# Patient Record
Sex: MALE | Race: WHITE
[De-identification: names, ages, dates, MRNs, and addresses within clinical notes are randomized per-mention and may not be internally consistent; named-entity substitution may affect disease eponyms.]

---

## 2017-05-10 ENCOUNTER — HOSPITAL ENCOUNTER (OUTPATIENT)
Dept: HOSPITAL 84 - D.RT | Age: 67
Discharge: HOME | End: 2017-05-10
Attending: INTERNAL MEDICINE
Payer: MEDICARE

## 2017-05-10 VITALS — BODY MASS INDEX: 29.4 KG/M2

## 2017-05-10 DIAGNOSIS — J44.9: Primary | ICD-10-CM

## 2017-09-08 ENCOUNTER — HOSPITAL ENCOUNTER (OUTPATIENT)
Dept: HOSPITAL 84 - D.CT | Age: 67
Discharge: HOME | End: 2017-09-08
Attending: FAMILY MEDICINE
Payer: MEDICARE

## 2017-09-08 VITALS — BODY MASS INDEX: 29.4 KG/M2

## 2017-09-08 DIAGNOSIS — J20.9: Primary | ICD-10-CM

## 2017-12-20 ENCOUNTER — HOSPITAL ENCOUNTER (OUTPATIENT)
Dept: HOSPITAL 84 - D.RAD | Age: 67
Discharge: HOME | End: 2017-12-20
Attending: FAMILY MEDICINE
Payer: MEDICARE

## 2017-12-20 VITALS — BODY MASS INDEX: 29.4 KG/M2

## 2017-12-20 DIAGNOSIS — R13.10: Primary | ICD-10-CM

## 2018-03-05 ENCOUNTER — HOSPITAL ENCOUNTER (EMERGENCY)
Dept: HOSPITAL 84 - D.ER | Age: 68
Discharge: HOME | End: 2018-03-05
Payer: MEDICARE

## 2018-03-05 VITALS — BODY MASS INDEX: 29.4 KG/M2

## 2018-03-05 DIAGNOSIS — S81.011A: Primary | ICD-10-CM
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
## 2020-04-02 ENCOUNTER — HOSPITAL ENCOUNTER (OUTPATIENT)
Dept: HOSPITAL 84 - D.CATH | Age: 70
Discharge: HOME | End: 2020-04-02
Attending: INTERNAL MEDICINE
Payer: MEDICARE

## 2020-04-02 VITALS — DIASTOLIC BLOOD PRESSURE: 81 MMHG | SYSTOLIC BLOOD PRESSURE: 153 MMHG

## 2020-04-02 VITALS — BODY MASS INDEX: 28.5 KG/M2 | WEIGHT: 210.44 LBS | HEIGHT: 72 IN | BODY MASS INDEX: 28.5 KG/M2

## 2020-04-02 DIAGNOSIS — R94.31: ICD-10-CM

## 2020-04-02 DIAGNOSIS — E78.5: ICD-10-CM

## 2020-04-02 DIAGNOSIS — I10: ICD-10-CM

## 2020-04-02 DIAGNOSIS — I20.9: Primary | ICD-10-CM

## 2020-04-02 LAB
ALT SERPL-CCNC: 25 U/L (ref 10–68)
ANION GAP SERPL CALC-SCNC: 9.8 MMOL/L (ref 8–16)
BASOPHILS NFR BLD AUTO: 0.2 % (ref 0–2)
BUN SERPL-MCNC: 18 MG/DL (ref 7–18)
CALCIUM SERPL-MCNC: 8.9 MG/DL (ref 8.5–10.1)
CHLORIDE SERPL-SCNC: 104 MMOL/L (ref 98–107)
CHOLEST/HDLC SERPL: 3.4 RATIO (ref 2.3–4.9)
CO2 SERPL-SCNC: 29.3 MMOL/L (ref 21–32)
CREAT SERPL-MCNC: 1.2 MG/DL (ref 0.6–1.3)
EOSINOPHIL NFR BLD: 5.3 % (ref 0–7)
ERYTHROCYTE [DISTWIDTH] IN BLOOD BY AUTOMATED COUNT: 12.9 % (ref 11.5–14.5)
GLUCOSE SERPL-MCNC: 149 MG/DL (ref 74–106)
HCT VFR BLD CALC: 45.1 % (ref 42–54)
HDLC SERPL-MCNC: 43 MG/DL (ref 32–96)
HGB BLD-MCNC: 14.5 G/DL (ref 13.5–17.5)
IMM GRANULOCYTES NFR BLD: 0 % (ref 0–5)
LDL-HDL RATIO: 2 RATIO (ref 1.5–3.5)
LDLC SERPL-MCNC: 85 MG/DL (ref 0–100)
LYMPHOCYTES NFR BLD AUTO: 30.2 % (ref 15–50)
MCH RBC QN AUTO: 30.9 PG (ref 26–34)
MCHC RBC AUTO-ENTMCNC: 32.2 G/DL (ref 31–37)
MCV RBC: 96 FL (ref 80–100)
MONOCYTES NFR BLD: 7.5 % (ref 2–11)
NEUTROPHILS NFR BLD AUTO: 56.8 % (ref 40–80)
OSMOLALITY SERPL CALC.SUM OF ELEC: 282 MOSM/KG (ref 275–300)
PLATELET # BLD: 242 10X3/UL (ref 130–400)
PMV BLD AUTO: 10.3 FL (ref 7.4–10.4)
POTASSIUM SERPL-SCNC: 4.1 MMOL/L (ref 3.5–5.1)
RBC # BLD AUTO: 4.7 10X6/UL (ref 4.2–6.1)
SODIUM SERPL-SCNC: 139 MMOL/L (ref 136–145)
TRIGL SERPL-MCNC: 101 MG/DL (ref 30–200)
WBC # BLD AUTO: 6.4 10X3/UL (ref 4.8–10.8)

## 2020-04-02 NOTE — NUR
PT REC'D TO ROOM 5 VIA BED FROM CATH LAB. MONITORS ESTAB. DAUGHTER AT
BS TO TRANSLATE. VSS. SEE RN ASSESSMENT.

## 2020-04-02 NOTE — NUR
L GROIN SITE SOFT, NO S/S BLEEDING OR HEMATOMA. FOOT WARM WITH PALP
PULSES. VSS. C/L IN REACH, ALARMS ON.

## 2020-04-02 NOTE — HEMODYNAMI
PATIENT:GONZALEZ REYES,RAFAEL                            MEDICAL RECORD: [id number]
: 46                                            LOCATION:D.CAT          
ACCT# [id number]                                       ADMISSION DATE: 20
 
 
 Generatedon:20209:[id number]
Patient name: RAFAEL GONZALEZ REYES Patient #: [id number] Visit #: [id number] 
SSN:
 : [id number] Date of study: 2020
Page: Of
Hemodynamic Procedure Report
****************************
Patient Data
Patient Demographics
Procedure consent was obtained
First Name: JONAS          Gender: Male
Last Name: GONZALEZ REYES   : 1950
Patient #: [id number]       Age: 69 year(s)
Visit #: [id number]       Race: Unknown
SSN: 
Accession #:
[id number]
Additional ID: [id number]
Contact details
Address: [address] [place] [address]     Phone: [phone]
State: [place]
City: [place]
Zip code: [address]
Past Medical History
Allergies: No known allergies
Admission
Admission Data
Admission Date: 2020    Admission Time: 7:09
Admit Source: Other         Insurance Payor: Medicaid,
Medicare
[facility] #: [id number]
Weight (lbs.): 205
Weight (kg.): 92.99
Lab Results
Lab Result Date: 2020   Lab Result Time: 8:00
Biochemistry
Name         Units    Result                Min      Max
BUN          mg/dl    18       --(---*)--   7        18
Creatinine   mg/dl    1.2      --(---*)--   0.6      1.3
eGFR         ml/min   63       *-(----)--   90       120
NONAFRICAN
CBC
Name         Units    Result                Min      Max
Hematocrit   %        45.1     --(-*--)--   42       54
Hemoglobin   g/dl     14.5     --(*---)--   13.5     17.5
Procedure
Procedure Types
Cath Procedure
Diagnostic Procedure
[facility] w/Coronaries
Procedure Description
Procedure Date
Procedure Date: 2020
 
Procedure Start Time: 9:20
Procedure End Time: 9:31
Procedure Staff
Name                            Function
Jeffrey Gamino MD                   Performing Physician
Buffie Gill RN                  Nurse
Ramana Quach RT                  Monitor
Alla Martinez RT                    Scrub
Indication
Dyspnea with exertion
Abnormal ECG
Angina
Palpitations
Procedure Data
Cath Procedure
Fluoroscopy
Diagnostic fluoroscopy      Total fluoroscopy Time: 1.9
time: 1.9 min               min
Diagnostic fluoroscopy      Total fluoroscopy dose: 460
dose: 460 mGy               mGy
Contrast Material
Contrast Material Type                       Amount (ml)
Isovue 370                                   52
Entry Location
Entry     Primary  Successful  Side  Size  Upsize Upsize Entry    Closure Succes
sful  Closure
Location                             (Fr)  1 (Fr) 2 (Fr) Remarks  Device        
      Remarks
Femoral                        Left  5 Fr                         Exoseal
artery
Estimated blood loss: 5 ml
Diagnostic catheters
Device Type               Used For           End Catheter
Placement
MULTIPACK JL 4.0 5Fr      Procedure
catheter
MULTIPACK 3DRC 5Fr        Procedure
catheter
MULTIPACK Pigtail 5 Fr    Procedure
catheter
Procedure Complications
No complications
Procedure Medications
Medication           Administration Route Dosage
Oxygen               etCO2 Nasal cannula  2 l/min
Lidocaine 2%         added to field       20
Heparin Flush Bag    added to field       2 bags
(1000units/500ml NS)
0.9% NaCl            I.V.                 100 ml/hr
Versed               I.V.                 2 mg
Fentanyl             I.V.                 100 mcg
Versed               I.V.                 1 mg
Hemodynamics
Rest
HGB: 14.5 (g/dl) Heart Rate: 63 (bpm)
Pressure Samples
Time     Site     Value (mmHg) Purpose      Heart      Use
Rate(bpm)
9:27     LV       131/-4,9     Snapshot     58
9:28     AO       139/68(96)   Pullback     58
 
9:28     LV       142/-2,10    Pullback     58
Gradients
Valve  Time  Site 1    Site 2     Mean    SEP/DFP    Peak To Heart  Use
(mmHg)  (sec/min)  Peak    Rate
(mmHg)  (bpm)
Aortic 9:28  LV        AO         7       18         3       58
142/-2,10 139/68(96)
Calculations
Valve    P-P      Mean      Valve     Index  Valve    Source
Name              Gradient  Area             Flow
(cm2)
Aortic   3        7
3        7
Snapshots
Pre Cath      Intra         NCS           Post Cath
Vital Signs
Time    Heart  Resp   SPO2 etCO2   NIBP (mmHg) Rhythm  Pain    Sedation
Rate   (ipm)  (%)  (mmHg)                      Status  Level
(bpm)
9:08:42 62     10     100  0       164/85(133) NSR     0 (11)  10(A)
, No
pain
9:13:01 61     11     98   0       148/83(118) NSR     0 (11)  10(A)
, No
pain
9:17:19 57     18     98   25.5    127/80(94)  NSR     0 (11)  10(A)
, No
pain
9:21:29 58     13     100  3       123/78(90)  NSR     0 (11)  9(A)
, No
pain
9:25:36 57     12     99   14.2    139/80(115) NSR     0 (11)  9(A)
, No
pain
9:29:50 59     15     100  39.8    128/78(104) NSR     0 (11)  10(A)
, No
pain
Medications
Time    Medication       Route   Dose  Verified Delivered Reason     Notes  Effe
ctiveness
by       by
9:11:19 Oxygen           etCO2   2     Jeffrey     Buffie    used for
Nasal   l/min Stevenson Gill RN   procedure
cannula
9:11:27 Lidocaine 2%     added   20ml  Jeffrey     Jeffrey      for local
to      vial  Stevenson Gamino MD  anesthetic
field
9:11:38 Heparin Flush    added   2     Jeffrey     Jeffrey      used for
Bag              to      bags  Stevenson Gamino MD  procedure
(1000units/500ml field
NS)
9:11:47 0.9% NaCl        I.V.    100   Jeffrey     Buffie    Per
ml/hr Stevenson Gill RN   physician
9:17:39 Versed           I.V.    2 mg  Jeffrey     Buffie    for
Stevenson Gill RN   sedation
9:17:46 Fentanyl         I.V.    100   Jeffrey     Buffie    for
mcg   Stevenson Gill RN   sedation
9:24:54 Versed           I.V.    1 mg  Jeffrey     Buffie    for
Stevenson Gill RN   sedation
Procedure Log
 
Time     Note
8:31:14  Informed consent obtained and on chart
8:43:41  **ACC** Patient presents with Stable Angina CCS
Anginal Class 2--Slight limitation of ordinary
activity.
8:44:45  Patient allergic to No known allergies
8:44:51  Admit Source: Other
8:44:56  Insurance Payor : Medicare, Medicaid
8:45:17  Risk of Mortality: <.1%
8:45:24  Patient Weight : 205 lbs
8:45:32  Risk of blood transfusion: <.1%
8:45:54  Risk of ABEL: 0.2
8:47:46  Lab Result : BUN 18 mg/dl
8:47:46  Lab Result : Hemoglobin 14.5 g/dl
8:47:46  Lab Result : Hematocrit 45.1 %
8:47:46  Lab Result : Creatinine 1.2 mg/dl
8:47:46  Lab Result : eGFR NONAFRICAN 63 ml/min
8:48:06  Diagnostic Cath Status : Elective
8:48:34  Indication : Dyspnea with exertion
8:48:49  Indication : Abnormal ECG
8:49:00  Indication : Angina
8:49:09  Indication : Palpitations
8:49:18  **ACC**Patient has been prescribed/administered the
following anti-anginal medication within the last 2
weeks: Beta Blocker, ARB
8:49:21  Procedure Status Elective Heart Cath (OP).
8:49:24  Time tracking: Regular hours (M-F 7:00 - 5:00)
8:49:28  Plan of Care:Hemodynamics will remain stable., Cardiac
rhythm will remain stable., Comfort level will be
maintained., Respiratory function will remain
adequate., Patient/ family verbilizes understanding of
procedure., Procedure tolerated without complication.,
Recovers from procedure without complications..
8:50:32  [facility] report unable to load.
8:50:43  H&P Date Dictated: 3/23/2020 Within 30 days and on
chart., H&P Addendum completed by physician on day of
procedure. (MUST COMPLETE FOR ALL OUTPATIENTS).
8:50:49  Alla Martinez RT(R) sent for patient. Start room use.
8:58:09  Patient received from Pre/Post Procedure Room to CCL 2
Alert and oriented. Tansferred to table in Supine
position.
8:58:11  Warm blankets applied, and mello hugger turned on for
patient comfort.
8:58:12  Correct patient and procedure confirmed by team.
8:58:13  ECG and BP/O2 sat monitors applied to patient.
8:58:15  Pre-procedure instructions explained to patient.
8:58:16  Pre-op teaching completed and patient verbalized
understanding.
8:58:17  Family unavailable due to COVID-19
9:05:49  Is the patient allergic to Iodine/contrast media? No.
9:05:54  Is patient on blood thinner?No
9:06:14  Patient diabetic? Yes.
9:06:14  If diabetic: On Metformin? Yes
9:06:18  If on Metformin: Last Dose? 3/31/2020
9:06:22  Previous problem with sedation/anesthesia? No ?
9:06:23  Snore? Yes
9:06:24  Sleep apnea? Yes
9:06:26  Deviated septum? No
9:06:27  Opens mouth fully? Yes
9:06:27  Sticks out tongue? Yes
 
9:06:31  Airway obstruction? No ?
9:06:33  Dentures? No ?
9:06:36  Pre procedure: left dorsailis pedis pulse 2+ Normal;
easily identifiable; not easily obliterated
9:06:39  Modified Redd's test Ulnar > 7 seconds.
9:06:42  Patient pain scale 0/10 ?.
9:06:48  IV patent on arrival in left forearm with 0.9% NaCl at
[facility].
9:06:50  Lab results completed and on chart.
9:07:07  Left groin area was prepped with chlora-prep and
draped in sterile fashion
9:07:08  Alarms reviewed by R. N.
9:07:08  Sharps counted by scrub and verified by R.N.
9:07:11  Use device set Femoral Dx
9:07:12  ACIST Syringe (23671) opened to sterile field.
9:07:12  Bag Decanter (2002S) opened to sterile field.
9:07:14  ACIST Hand Control (73176) opened to sterile field.
9:07:14  ACIST Manifold (67519) opened to sterile field.
9:07:15  Tegaderm 4 x 4 (1626W) opened to sterile field.
9:07:15  DIAGNOSTIC Multipack 5Fr catheter set ([id number]) opened
to sterile field.
9:07:16  SHEATH 5FR Emmonak (RIB527) opened to sterile field.
9:07:17  EMERALD Guide Wire (466-483) opened to sterile field.
9:07:19  Medline Cath Pack ([id number]) opened to sterile field.
9:07:31  Vital chart was started
9:08:19  Baseline sample Acquired.
9:09:34  Rhythm: sinus rhythm
9:09:36  Full Disclosure recording started
9:11:19  Oxygen 2 l/min etCO2 Nasal cannula was administered by
Abida Gill RN; used for procedure; Verbal order read
back and verified.
9:11:27  Lidocaine 2% 20ml vial added to field was administered
by Jeffrey Gamino MD; for local anesthetic; Verbal order
read back and verified.
9:11:38  Heparin Flush Bag (1000units/500ml NS) 2 bags added to
field was administered by Jeffrey Gamino MD; used for
procedure; Verbal order read back and verified.
9:11:47  0.9% NaCl 100 ml/hr I.V. was administered by Abida Gill RN; Per physician; Verbal order read back and
verified.
9:16:36  Physician arrived
9::37  --------ALL STOP TIME OUT------
:16:37  Final Timeout: patient, procedure, and site verified
with staff and physician. All members of the team are
in agreement.
9:16:39  Left groin site verified by team.
9:16:42  Fire Safety Assessment: A--An alcohol-based skin
anteseptic being used preoperatively., C--Open oxygen
or nitrous oxide is being used., D--An ESU, laser, or
fiber-optic light is being used.
9:16:45  Physical assessment completed. ASA score P 3 - A
patient with severe systemic disease as per Jeffrey Gamino MD.
9:16:57  2) 60-89 Mildly reduced kidney function, and other
findings (as for stage 1) point to kidney disease.
9:17:31  Maximum allowable contrast dose (3.7 X eGFR X 0.75)174
ml.
9:17:34  Sedation plan: IV Moderate Sedation Medication:Versed,
Fentanyl
9:17:39  Versed 2 mg I.V. was administered by Abida Gill RN;
 
for sedation; Verbal order read back and verified.
9:17:46  Fentanyl 100 mcg I.V. was administered by Abida Gill
RN; for sedation; Verbal order read back and verified.
9:20:26  Procedure started.
9:20:30  Local anesthetic to left femerol artery with Lidocaine
2% by Jeffrey Gamino MD.**INITIAL ACCESS ONLY**
9:20:36  A 5 Fr sheath was inserted into the Left Femoral
artery
9:20:39  Zero performed for pressure channel P1
9:22:39  A MULTIPACK JL 4.0 5Fr catheter was advanced over the
wire and used for Procedure.
9:23:30  LCA angiography performed.
9:24:54  Versed 1 mg I.V. was administered by Abida Gill RN;
for sedation; Verbal order read back and verified.
9:25:27  Catheter exchanged over wire.
9:25:31  A MULTIPACK 3DRC 5Fr catheter was advanced over the
wire and used for Procedure.
9:25:38  RCA angiography performed.
9:26:11  Catheter exchanged over wire.
9::14  A MULTIPACK Pigtail 5 Fr catheter was advanced over
the wire and used for Procedure.
9::  LV hemodynamics recorded.
::  LV gram done using GRAVES
9::  EF : 50 %
9::12  Injector settings: Ml/sec: 10, Volume: 20,
9:28:22  Catheter removed.
9::24  EXOSEAL 5Fr () opened to sterile field.
9::34  Sheath removed intact; hemostasis achieved with
Exoseal to the Left Femoral artery.
9:28:36  Procedure ended.(Physican Out)
9::  Fluoroscopy time 01.90 minutes.
9::36  Fluoroscopy dose: 460 mGy
9::  Flurop Dose total: 460
9::  Dose Area Product 88687 mGy/cm.
9:30:00  Contrast amount:Isovue 370 52ml.
9:30:02  Maximum allowable dose exceeded? No.
9:30:03  Sharps counted by scrub and verified by R.N.
9:30:04  Insertion/operative site no bleeding no hematoma.
9:30:06  Post-op/insertion site Left Femoral artery dressed
using a 4 x 4 and Tegaderm.
9:30:11  Post left femerol artery:stable, soft, clean and dry
9:30:12  Post Procedure Pulses reassessed and unchanged
9:30:16  Post-procedure physical assessment completed. ASA
score P 2 - A patient with mild systemic disease as
per Jeffrey Gamino MD.
9:30:18  Post procedure rhythm: unchanged.
9:30:20  Estimated blood loss: 5 ml
9:30:21  Post procedure instruction explained to
patient.Patient verbalizes understanding.
9:30:21  Patient needs reinforcement of post procedure
teaching.
9::52  Procedure and supply charges have been captured,
reviewed, submitted and are correct.
::55  Procedure Complication : No complications
:58  Vital chart was stopped
9::04  Operative report dictated upon procedure completion.
9:31:04  See physician's report for complete and final results.
9:31:05  Report given to Pre/Post Procedure Room.
9:31:08  Patient transfered to Pre/Post Procedure Room with
Stretcher.
 
::  Procedure ended.
9:31:09  Full Disclosure recording stopped
9:31:30  End room use (Document Last)
9:31:53  End room use (Document Last)
9:32:26  Ramana Quach RT(R) was relieved by Abida Gill RN as
monitoring person
9:32:26  End room use (Document Last)
Device Usage
Item Name   Manufacture  Quantity  Catalog    Hospital Part    Current Minimal L
ot# /
Number     Charge   Number  Stock   Stock   Serial#
Code
ACIST       Acist        1         46571      [id number]   [id number]  269424  20
Syringe     Medical
(76026)     Systems Inc
Bag         Microtek     1         S      435312   78141   446431  5
Decanter    [employer] Inc.
()
ACIST Hand  Acist        1         17109      407355   765472  884487  5
Control     Medical
(08053)     Systems Inc
ACIST       Acist        1         52945      310729   229639  436008  5
Manifold    Medical
(10733)     Systems Inc
Tegaderm 4  3M           1         1626W      598079   455601  282525  5
x 4 (1626W)
DIAGNOSTIC  Cardinal     1         NV3582     188768   43668   278318  30
Multipack   Health
5Fr
catheter
set
(MC3548)
SHEATH 5FR  Terumo       1         FLX000     206081   532820  253078  5
Emmonak
(ETJ527)
EMERALD     Cardinal     1         502-455    140341   373731  861603  5
Guide Wire  Health
(502455)
Medline     Medline      1         [id number]  390216   97271   113020  5
Cath Pack
([id number])
MULTIPACK   Cardinal     1                                     432850  5
JL 4.0 5Fr  Health
catheter
MULTIPACK   Cardinal     1                                     817755  5
3DRC 5Fr    Health
catheter
MULTIPACK   Cardinal     1                                     232718  5
Pigtail 5   Health
Fr catheter
EXOSEAL 5Fr Cardinal     1               966043   346758  380603  10
()     Health
Signature Audit [place]
Stage           Time        Signature      Unsigned
Intra-Procedure 2020    Ramana Quach
9:31:53 AM  RT(R)
Intra-Procedure 2020    Abida Gill
9:32:46 AM  RN; Jeffrey Gamino MD
Signatures
 
Performing Physician :  Signature :
Jeffrey Gamino MD           ______________________________
Date : ______________ Time :
______________
Nurse : Buffie Gill RN   Signature :
______________________________
Date : ______________ Time :
______________
Monitor : Ramana Quach RT Signature :
______________________________
Date : ______________ Time :
______________
 
 
 
 
 
 
 
 
 
 
 
 
 
 
 
 
 
 
 
 
 
 
 
 
 
 
 
 
 
 
 
 
 
 
 
 
 
 
 
 
 
 
 
[address] [place] [address]

## 2020-04-02 NOTE — NUR
L GROIN SITE SOFT, C/D/I, VSS. PT HOB ELEVATED, SANDWICH TRAY
PROVIDED.  ALARMS ON AND C/L IN REACH.